# Patient Record
Sex: MALE | Race: WHITE
[De-identification: names, ages, dates, MRNs, and addresses within clinical notes are randomized per-mention and may not be internally consistent; named-entity substitution may affect disease eponyms.]

---

## 2020-04-30 ENCOUNTER — HOSPITAL ENCOUNTER (OUTPATIENT)
Dept: HOSPITAL 92 - SCSCT | Age: 55
Discharge: HOME | End: 2020-04-30
Attending: UROLOGY
Payer: COMMERCIAL

## 2020-04-30 DIAGNOSIS — R36.1: ICD-10-CM

## 2020-04-30 DIAGNOSIS — N20.1: ICD-10-CM

## 2020-04-30 DIAGNOSIS — R31.29: Primary | ICD-10-CM

## 2020-04-30 LAB
RBC UR QL AUTO: (no result) HPF (ref 0–3)
WBC UR QL AUTO: (no result) HPF (ref 0–3)

## 2020-04-30 PROCEDURE — 87086 URINE CULTURE/COLONY COUNT: CPT

## 2020-04-30 PROCEDURE — 87077 CULTURE AEROBIC IDENTIFY: CPT

## 2020-04-30 PROCEDURE — 81001 URINALYSIS AUTO W/SCOPE: CPT

## 2020-04-30 PROCEDURE — 74178 CT ABD&PLV WO CNTR FLWD CNTR: CPT

## 2020-04-30 NOTE — CT
CT ABDOMEN AND PELVIS WITH AND WITHOUT CONTRAST:

 

HISTORY: 

Hematuria.

 

COMPARISON: 

None.

 

FINDINGS: 

The lung bases are clear.  No pericardial effusion.

 

There is no nephroureteral lithiasis nor hydroureteral nephrosis.  No secondary evidence of a recentl
y passed stone.  

 

Multiple cysts of both kidneys, the majority of which are subcentimeter in size.  There is a larger e
xophytic left superior pole cyst measuring fluid attenuation and 16 mm in size without significant in
ternal enhancement.

 

No abnormally enhancing urothelial lesion.  On the delayed phase of contrast, the calyces are without
 filling defect.  The renal pelves are without filling defect.  The ureters are without filling defec
t.  Old injury to right iliac wing with some heterotopic ossification.  Tracks from femoral nails in 
both proximal femurs.  No acute osseous abnormality.

 

Mild to moderate degenerative disk space height loss at L5-S1 with circumferential disk-osteophyte co
mplex.

 

IMPRESSION: 

1.  No nephroureteral lithiasis nor hydroureteral nephrosis.  No secondary evidence of a recently pas
sed stone.

 

2.  No acute inflammatory process in the abdomen or pelvis.

 

3.  Bilateral renal cysts.

 

4.  No abnormal filling defect to suggest a urothelial malignancy.

 

POS: [facility]